# Patient Record
Sex: FEMALE | Race: BLACK OR AFRICAN AMERICAN | Employment: OTHER | URBAN - METROPOLITAN AREA
[De-identification: names, ages, dates, MRNs, and addresses within clinical notes are randomized per-mention and may not be internally consistent; named-entity substitution may affect disease eponyms.]

---

## 2021-12-29 ENCOUNTER — APPOINTMENT (OUTPATIENT)
Dept: CT IMAGING | Age: 86
End: 2021-12-29
Payer: MEDICARE

## 2021-12-29 ENCOUNTER — APPOINTMENT (OUTPATIENT)
Dept: GENERAL RADIOLOGY | Age: 86
End: 2021-12-29
Payer: MEDICARE

## 2021-12-29 ENCOUNTER — HOSPITAL ENCOUNTER (EMERGENCY)
Age: 86
Discharge: HOME | End: 2021-12-30
Attending: EMERGENCY MEDICINE
Payer: MEDICARE

## 2021-12-29 DIAGNOSIS — R55 SYNCOPE AND COLLAPSE: Primary | ICD-10-CM
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
## 2021-12-29 LAB
BASOPHILS ABSOLUTE: 0 K/UL (ref 0–0.2)
BASOPHILS RELATIVE PERCENT: 0.2 %
EKG ATRIAL RATE: 92 BPM
EKG DIAGNOSIS: NORMAL
EKG P AXIS: 33 DEGREES
EKG P-R INTERVAL: 120 MS
EKG Q-T INTERVAL: 364 MS
EKG QRS DURATION: 100 MS
EKG QTC CALCULATION (BAZETT): 450 MS
EKG R AXIS: -19 DEGREES
EKG T AXIS: 13 DEGREES
EKG VENTRICULAR RATE: 92 BPM
EOSINOPHILS ABSOLUTE: 0 K/UL (ref 0–0.6)
EOSINOPHILS RELATIVE PERCENT: 0.4 %
HCT VFR BLD CALC: 34.3 % (ref 36–48)
HEMOGLOBIN: 10.8 G/DL (ref 12–16)
INR BLD: 1.02 (ref 0.88–1.12)
LYMPHOCYTES ABSOLUTE: 0.7 K/UL (ref 1–5.1)
LYMPHOCYTES RELATIVE PERCENT: 10.3 %
MCH RBC QN AUTO: 31.1 PG (ref 26–34)
MCHC RBC AUTO-ENTMCNC: 31.6 G/DL (ref 31–36)
MCV RBC AUTO: 98.6 FL (ref 80–100)
MONOCYTES ABSOLUTE: 0.7 K/UL (ref 0–1.3)
MONOCYTES RELATIVE PERCENT: 11.3 %
NEUTROPHILS ABSOLUTE: 5.1 K/UL (ref 1.7–7.7)
NEUTROPHILS RELATIVE PERCENT: 77.8 %
PDW BLD-RTO: 22.8 % (ref 12.4–15.4)
PLATELET # BLD: 126 K/UL (ref 135–450)
PMV BLD AUTO: 7.2 FL (ref 5–10.5)
PROTHROMBIN TIME: 11.5 SEC (ref 9.9–12.7)
RBC # BLD: 3.48 M/UL (ref 4–5.2)
WBC # BLD: 6.5 K/UL (ref 4–11)

## 2021-12-29 PROCEDURE — 85610 PROTHROMBIN TIME: CPT

## 2021-12-29 PROCEDURE — 93005 ELECTROCARDIOGRAM TRACING: CPT | Performed by: STUDENT IN AN ORGANIZED HEALTH CARE EDUCATION/TRAINING PROGRAM

## 2021-12-29 PROCEDURE — 70450 CT HEAD/BRAIN W/O DYE: CPT

## 2021-12-29 PROCEDURE — 36415 COLL VENOUS BLD VENIPUNCTURE: CPT

## 2021-12-29 PROCEDURE — 72170 X-RAY EXAM OF PELVIS: CPT

## 2021-12-29 PROCEDURE — 85025 COMPLETE CBC W/AUTO DIFF WBC: CPT

## 2021-12-29 PROCEDURE — 80048 BASIC METABOLIC PNL TOTAL CA: CPT

## 2021-12-29 PROCEDURE — 72125 CT NECK SPINE W/O DYE: CPT

## 2021-12-29 PROCEDURE — 99283 EMERGENCY DEPT VISIT LOW MDM: CPT

## 2021-12-29 ASSESSMENT — PAIN SCALES - GENERAL: PAINLEVEL_OUTOF10: 10

## 2021-12-30 VITALS
HEART RATE: 92 BPM | OXYGEN SATURATION: 94 % | TEMPERATURE: 99 F | DIASTOLIC BLOOD PRESSURE: 53 MMHG | RESPIRATION RATE: 16 BRPM | SYSTOLIC BLOOD PRESSURE: 119 MMHG

## 2021-12-30 PROBLEM — R55 SYNCOPE AND COLLAPSE: Status: ACTIVE | Noted: 2021-12-30

## 2021-12-30 PROCEDURE — G0378 HOSPITAL OBSERVATION PER HR: HCPCS

## 2021-12-30 RX ORDER — SODIUM CHLORIDE 0.9 % (FLUSH) 0.9 %
5-40 SYRINGE (ML) INJECTION PRN
Status: CANCELLED | OUTPATIENT
Start: 2021-12-30

## 2021-12-30 RX ORDER — SODIUM CHLORIDE 9 MG/ML
25 INJECTION, SOLUTION INTRAVENOUS PRN
Status: CANCELLED | OUTPATIENT
Start: 2021-12-30

## 2021-12-30 RX ORDER — ACETAMINOPHEN 650 MG/1
650 SUPPOSITORY RECTAL EVERY 6 HOURS PRN
Status: CANCELLED | OUTPATIENT
Start: 2021-12-30

## 2021-12-30 RX ORDER — SODIUM CHLORIDE 0.9 % (FLUSH) 0.9 %
5-40 SYRINGE (ML) INJECTION EVERY 12 HOURS SCHEDULED
Status: CANCELLED | OUTPATIENT
Start: 2021-12-30

## 2021-12-30 RX ORDER — ONDANSETRON 4 MG/1
4 TABLET, ORALLY DISINTEGRATING ORAL EVERY 8 HOURS PRN
Status: CANCELLED | OUTPATIENT
Start: 2021-12-30

## 2021-12-30 RX ORDER — ACETAMINOPHEN 325 MG/1
650 TABLET ORAL EVERY 6 HOURS PRN
Status: CANCELLED | OUTPATIENT
Start: 2021-12-30

## 2021-12-30 RX ORDER — ONDANSETRON 2 MG/ML
4 INJECTION INTRAMUSCULAR; INTRAVENOUS EVERY 6 HOURS PRN
Status: CANCELLED | OUTPATIENT
Start: 2021-12-30

## 2021-12-30 RX ORDER — POLYETHYLENE GLYCOL 3350 17 G/17G
17 POWDER, FOR SOLUTION ORAL DAILY PRN
Status: CANCELLED | OUTPATIENT
Start: 2021-12-30

## 2021-12-30 NOTE — ED PROVIDER NOTES
ED Attending Attestation Note     Date of evaluation: 12/29/2021    This patient was seen by the resident. I have seen and examined the patient, agree with the workup, evaluation, management and diagnosis. The care plan has been discussed. I have reviewed the ECG and concur with the resident's interpretation. She was getting cookies with her son after dialysis and was otherwise feeling well. ~ 2 PM She tripped over her foot vs had a syncopal event afterwards. She had 1 episode of emesis. She has some neck pain. Initially went to [facility] but the wait was too long so she came here. CT Cervical Spine WO Contrast   Final Result   1. No acute intracranial findings. CT CERVICAL SPINE:      FINDINGS: 1 to 2 mm grade 1 anterolisthesis of C4 on C5 likely degenerative. No other spondylolisthesis. No fracture. Moderate degenerative disc space narrowing at C6-C7 and to a lesser extent at C5-C6. No compromise of the bony spinal canal.      Incidental note of markedly enlarged multinodular goiter. IMPRESSION:   1. No acute abnormalities of the cervical spine. 2. Partially imaged markedly enlarged multinodular goiter. CT Head WO Contrast   Final Result   1. No acute intracranial findings. CT CERVICAL SPINE:      FINDINGS: 1 to 2 mm grade 1 anterolisthesis of C4 on C5 likely degenerative. No other spondylolisthesis. No fracture. Moderate degenerative disc space narrowing at C6-C7 and to a lesser extent at C5-C6. No compromise of the bony spinal canal.      Incidental note of markedly enlarged multinodular goiter. IMPRESSION:   1. No acute abnormalities of the cervical spine. 2. Partially imaged markedly enlarged multinodular goiter.       XR PELVIS (1-2 VIEWS)    (Results Pending)     Labs Reviewed   CBC WITH AUTO DIFFERENTIAL   BASIC METABOLIC PANEL W/ REFLEX TO MG FOR LOW K        My assessment reveals a nontoxic, alert, awake and oriented woman who is moving all 4 extremities spontaneously and symmetrically. She has an approximately 4 to 5 cm hematoma on her occiput. She does not have any cervical spine tenderness to palpation. She does have some tenderness to palpation of her sacrum, mostly on the right. She does not have pain on logroll nor does she have any deformity of her hips or extremities.      Marge Conklin MD  12/29/21 [id number]

## 2021-12-30 NOTE — ED PROVIDER NOTES
[address] [facility] RESIDENT NOTE       Date of evaluation: 12/29/2021    Chief Complaint     Fall and Loss of Consciousness      History of Present Illness     Luis E Corey is a 81 y.o. female who presents following ground-level fall today. Patient reports receiving dialysis her usual Monday Wednesday Friday schedule, going home with her grandson, and after getting out of his car and was walking up the steps to the apartment when she she fell backwards hit her head at 1400 today. She does not remember what happened. Believes she lost consciousness for unknown amount of time. She called her grandson who came back to help her up. She was able to ambulate after the fall. Went into the apartment and had 1 episode of nonbloody nonbilious emesis. She presented to the [facility] emergency department and was seen at triage, however waited for a long time before leaving to be seen here. Endorses pain over the occiput. Denies neck pain. She endorses pain at coccyx. No weakness numbness or paresthesias. No chest pain, palpitations, vision changes or prodrome. No history of seizures and reportedly no seizure-like activity or confusion following fall. States she was feeling otherwise well this morning before dialysis and after before the fall. Does not have a primary care doctor. 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 Review of Systems     Review of Systems  Pertinent positives and negatives reviewed in the HPI. A complete review of was otherwise obtained is negative except as noted above. Past Medical, Surgical, Family, and Social History     She has no past medical history on file. She has no past surgical history on file. Her family history is not on file. She     Medications     Previous Medications    No medications on file       Allergies     She has no allergies on file.     Physical Exam     INITIAL VITALS: BP: 130/71, Temp: 98.7 °F (37.1 °C), Pulse: 95, Resp: 18, SpO2: 100 %   Physical Exam    Constitutional:  81 y.o. female, been; well developed; in no apparent distress. HEENT: Small occipital hematoma without scalp lacerations or bleeding. No bruising patient's to the face. Mucous membranes are moist.  Eyes: Anicteric, PERRL, EOMI  Neck:  Supple, Full ROM, trachea midline  Pulmonary:   Lungs clear to auscultation bilaterally with symmetric aeration. No Wheezes/Rales/Rhonchi. Cardiac:  Regular rate and rhythm. No murmurs/rubs/gallops. Abdomen:  Soft, non-tender, non-distended. Extremities: 2+ radial pulses. No extremity deformity, swelling or tenderness appreciated. Left brachial fistula, recently accessed, hemostatic with palpable thrill. Skin:  No rashes or bruising. Neuro:  Alert and oriented x3. Speech normal. Moves UE and LE spontaneously and symmetrically. GCS 15. Musculoskeletal: No tenderness to palpation over sternum, clavicles, chest wall, extremities or pelvis. No midline spinal tenderness. Gluteal squeeze. Normal active range of motion bilateral shoulders elbows knees ankles and hips. No pain BL LEs with log roll. Psych:  Mood and affect appropriate for situation. DiagnosticResults     EKG   Interpreted in conjunction with emergencydepartment physician Sarthak Connolly MD  Rhythm: normal sinus   Rate: normal  Axis: normal  Ectopy: premature atrial contraction  Conduction: normal  ST Segments: normal  T Waves:normal and inversion in  V1, V2  Q Waves: none  Clinical Impression: non-specific EKG, motion artifact in leads V3 and V6  Comparison: None available for comparison chart    RADIOLOGY:  XR PELVIS (1-2 VIEWS)   Final Result   1. No acute findings in the pelvis. CT Cervical Spine WO Contrast   Final Result   1. No acute intracranial findings. CT CERVICAL SPINE:      FINDINGS: 1 to 2 mm grade 1 anterolisthesis of C4 on C5 likely degenerative. No other spondylolisthesis. No fracture.  Moderate degenerative disc space narrowing at C6-C7 and to a lesser extent at C5-C6. No compromise of the bony spinal canal.      Incidental note of markedly enlarged multinodular goiter. IMPRESSION:   1. No acute abnormalities of the cervical spine. 2. Partially imaged markedly enlarged multinodular goiter. CT Head WO Contrast   Final Result   1. No acute intracranial findings. CT CERVICAL SPINE:      FINDINGS: 1 to 2 mm grade 1 anterolisthesis of C4 on C5 likely degenerative. No other spondylolisthesis. No fracture. Moderate degenerative disc space narrowing at C6-C7 and to a lesser extent at C5-C6. No compromise of the bony spinal canal.      Incidental note of markedly enlarged multinodular goiter. IMPRESSION:   1. No acute abnormalities of the cervical spine. 2. Partially imaged markedly enlarged multinodular goiter.           LABS:   Results for orders placed or performed during the hospital encounter of 12/29/21   CBC Auto Differential   Result Value Ref Range    WBC 6.5 4.0 - 11.0 K/uL    RBC 3.48 (L) 4.00 - 5.20 M/uL    Hemoglobin 10.8 (L) 12.0 - 16.0 g/dL    Hematocrit 34.3 (L) 36.0 - 48.0 %    MCV 98.6 80.0 - 100.0 fL    MCH 31.1 26.0 - 34.0 pg    MCHC 31.6 31.0 - 36.0 g/dL    RDW 22.8 (H) 12.4 - 15.4 %    Platelets 477 (L) 733 - 450 K/uL    MPV 7.2 5.0 - 10.5 fL    Neutrophils % 77.8 %    Lymphocytes % 10.3 %    Monocytes % 11.3 %    Eosinophils % 0.4 %    Basophils % 0.2 %    Neutrophils Absolute 5.1 1.7 - 7.7 K/uL    Lymphocytes Absolute 0.7 (L) 1.0 - 5.1 K/uL    Monocytes Absolute 0.7 0.0 - 1.3 K/uL    Eosinophils Absolute 0.0 0.0 - 0.6 K/uL    Basophils Absolute 0.0 0.0 - 0.2 K/uL   PT - INR   Result Value Ref Range    Protime 11.5 9.9 - 12.7 sec    INR 1.02 0.88 - 1.12   EKG 12 Lead   Result Value Ref Range    Ventricular Rate 92 BPM    Atrial Rate 92 BPM    P-R Interval 120 ms    QRS Duration 100 ms    Q-T Interval 364 ms    QTc Calculation (Bazett) 450 ms    P Axis 33 degrees    R Axis -19 degrees    T Axis 13 degrees    Diagnosis       EKG performed in ER and to be interpreted by ER physician. Confirmed by MD, ER ([id number]),  Alexandro Rodarte ([phone]) on 12/29/2021 7:47:28 PM       ED BEDSIDE ULTRASOUND:  None    RECENT VITALS:  BP: (!) 105/54, Temp: 98.7 °F (37.1 °C), Pulse: 93,Resp: 20, SpO2: 95 %     Procedures     None    ED Course     Nursing Notes, Past Medical Hx, Past Surgical Hx, Social Hx, Allergies, and Family Hx were reviewed. The patient was given the followingmedications:  No orders of the defined types were placed in this encounter. CONSULTS:  IP CONSULT TO HOSPITALIST    MEDICAL DECISION MAKING / ASSESSMENT / Malissabenja Damon is a 81 y.o. female with history of ESRD on dialysis who presents with probable fall. Patient is otherwise well-appearing. Noncontrast head CT and C-spine are reassuring against traumatic intracranial hemorrhage or acute C-spine fractures. Patient's history is most consistent with fall and concerning for exertional syncope versus mechanical fall. Patient does not have a current primary care doctor and has not had recent cardiac evaluation. EKG without arrhythmia or heart block. Patient had transient hypotension here. Patient wished to go home, however because she does not have a primary care doctor presentation was concerning for cardiac syncope, I recommended admission. The patient and daughter were agreeable to this plan. This patient was also evaluated by the attending physician. All care plans werediscussed and agreed upon. At this time the patient has been admitted to present for further evaluation and management of syncope. The patient will continue to be monitored here in the emergency department until which time they are moved to their new treatment location. Clinical Impression     1. Syncope and collapse    2.  Fall from ground level        Disposition     PATIENT REFERRED TO:  No follow-up provider specified.     DISCHARGE MEDICATIONS:  New Prescriptions    No medications on file       DISPOSITION Admission to [facility] Quinten Bailey MD  Resident  12/30/21 [id number]

## 2021-12-30 NOTE — ED TRIAGE NOTES
Pt. States she was coming home after dialysis and had an LOC walking inside and fell backwards onto the ground. States hit back of head on the concrete ground. Denies dizziness, chest pain, and SOB.

## 2021-12-30 NOTE — PROGRESS NOTES
I was asked to see this patient for potential admission    Patient is an 81-year-old female with past medical history of end-stage renal disease on hemodialysis. She underwent dialysis today and after going home usually her grandson helps her get out of the car and walk up the steps to her apartment. She said that she tried to get up herself and do it and she fell which she describes as being a mechanical fall. She denies any lightheadedness or dizziness or any chest pain or tachycardia prior to the fall. Patient said that she usually feels weak during and after her dialysis sessions and feels that most likely this was because of that. Overall patient seems to be stable at this point and I do not feel that she needs to be admitted to the hospital for any further work-up. Patient's recent cardiac work-up including echo has been satisfactory. Echo from 8/21 revealed EF of 06-34%, grade 1 diastolic dysfunction. Her valves are also normal.  Patient is alert and awake at this time and her blood pressure at the time of my assessment was 120/50. Overall patient is stable to be discharged from the ED and she can follow-up outpatient with her own nephrologist.  I advised the patient that if she started having chest pain, lightheadedness or any other symptoms she should return to the ED. Patient verbalized understanding.     Peyman Mcnulty MD  Hospitalist